# Patient Record
Sex: MALE | Race: ASIAN | NOT HISPANIC OR LATINO | URBAN - METROPOLITAN AREA
[De-identification: names, ages, dates, MRNs, and addresses within clinical notes are randomized per-mention and may not be internally consistent; named-entity substitution may affect disease eponyms.]

---

## 2019-03-26 ENCOUNTER — OFFICE VISIT - HEALTHEAST (OUTPATIENT)
Dept: FAMILY MEDICINE | Facility: CLINIC | Age: 64
End: 2019-03-26

## 2019-03-26 DIAGNOSIS — R73.09 ELEVATED HEMOGLOBIN A1C: ICD-10-CM

## 2019-03-26 DIAGNOSIS — Z00.00 ANNUAL PHYSICAL EXAM: ICD-10-CM

## 2019-03-26 DIAGNOSIS — Z12.11 SCREEN FOR COLON CANCER: ICD-10-CM

## 2019-03-26 DIAGNOSIS — E66.01 MORBID OBESITY (H): ICD-10-CM

## 2019-03-26 DIAGNOSIS — F41.9 ANXIETY: ICD-10-CM

## 2019-03-26 DIAGNOSIS — R03.0 ELEVATED BLOOD PRESSURE READING WITHOUT DIAGNOSIS OF HYPERTENSION: ICD-10-CM

## 2019-03-26 LAB
ALBUMIN SERPL-MCNC: 3.9 G/DL (ref 3.5–5)
ALP SERPL-CCNC: 87 U/L (ref 45–120)
ALT SERPL W P-5'-P-CCNC: 24 U/L (ref 0–45)
ANION GAP SERPL CALCULATED.3IONS-SCNC: 10 MMOL/L (ref 5–18)
AST SERPL W P-5'-P-CCNC: 17 U/L (ref 0–40)
BILIRUB SERPL-MCNC: 0.4 MG/DL (ref 0–1)
BUN SERPL-MCNC: 21 MG/DL (ref 8–22)
CALCIUM SERPL-MCNC: 9.6 MG/DL (ref 8.5–10.5)
CHLORIDE BLD-SCNC: 103 MMOL/L (ref 98–107)
CHOLEST SERPL-MCNC: 161 MG/DL
CO2 SERPL-SCNC: 29 MMOL/L (ref 22–31)
CREAT SERPL-MCNC: 0.88 MG/DL (ref 0.7–1.3)
FASTING STATUS PATIENT QL REPORTED: YES
GFR SERPL CREATININE-BSD FRML MDRD: >60 ML/MIN/1.73M2
GLUCOSE BLD-MCNC: 116 MG/DL (ref 70–125)
HBA1C MFR BLD: 6.6 % (ref 3.5–6)
HDLC SERPL-MCNC: 44 MG/DL
LDLC SERPL CALC-MCNC: 78 MG/DL
POTASSIUM BLD-SCNC: 4.5 MMOL/L (ref 3.5–5)
PROT SERPL-MCNC: 7.4 G/DL (ref 6–8)
SODIUM SERPL-SCNC: 142 MMOL/L (ref 136–145)
TRIGL SERPL-MCNC: 196 MG/DL

## 2019-03-26 ASSESSMENT — MIFFLIN-ST. JEOR: SCORE: 1812.2

## 2019-03-27 ENCOUNTER — COMMUNICATION - HEALTHEAST (OUTPATIENT)
Dept: FAMILY MEDICINE | Facility: CLINIC | Age: 64
End: 2019-03-27

## 2019-03-27 DIAGNOSIS — R73.09 ELEVATED HEMOGLOBIN A1C: ICD-10-CM

## 2019-03-29 RX ORDER — ALPRAZOLAM 0.5 MG
0.5 TABLET ORAL
Qty: 10 TABLET | Refills: 0 | Status: SHIPPED | OUTPATIENT
Start: 2019-03-29

## 2019-04-22 ENCOUNTER — RECORDS - HEALTHEAST (OUTPATIENT)
Dept: ADMINISTRATIVE | Facility: OTHER | Age: 64
End: 2019-04-22

## 2020-07-06 ENCOUNTER — OFFICE VISIT - HEALTHEAST (OUTPATIENT)
Dept: FAMILY MEDICINE | Facility: CLINIC | Age: 65
End: 2020-07-06

## 2020-07-06 ENCOUNTER — COMMUNICATION - HEALTHEAST (OUTPATIENT)
Dept: FAMILY MEDICINE | Facility: CLINIC | Age: 65
End: 2020-07-06

## 2020-07-06 DIAGNOSIS — E66.01 MORBID OBESITY (H): ICD-10-CM

## 2020-07-06 DIAGNOSIS — E78.2 MIXED HYPERLIPIDEMIA: ICD-10-CM

## 2020-07-06 DIAGNOSIS — Z00.00 ROUTINE GENERAL MEDICAL EXAMINATION AT A HEALTH CARE FACILITY: ICD-10-CM

## 2020-07-06 DIAGNOSIS — Z12.5 SCREENING FOR PROSTATE CANCER: ICD-10-CM

## 2020-07-06 DIAGNOSIS — I10 ESSENTIAL HYPERTENSION: ICD-10-CM

## 2020-07-06 DIAGNOSIS — R73.09 ELEVATED HEMOGLOBIN A1C: ICD-10-CM

## 2020-07-06 DIAGNOSIS — R10.31 RLQ ABDOMINAL PAIN: ICD-10-CM

## 2020-07-06 LAB
ALBUMIN SERPL-MCNC: 3.7 G/DL (ref 3.5–5)
ALBUMIN UR-MCNC: ABNORMAL MG/DL
ALP SERPL-CCNC: 91 U/L (ref 45–120)
ALT SERPL W P-5'-P-CCNC: 33 U/L (ref 0–45)
ANION GAP SERPL CALCULATED.3IONS-SCNC: 11 MMOL/L (ref 5–18)
APPEARANCE UR: CLEAR
AST SERPL W P-5'-P-CCNC: 20 U/L (ref 0–40)
ATRIAL RATE - MUSE: 68 BPM
BACTERIA #/AREA URNS HPF: ABNORMAL HPF
BASOPHILS # BLD AUTO: 0 THOU/UL (ref 0–0.2)
BASOPHILS NFR BLD AUTO: 0 % (ref 0–2)
BILIRUB SERPL-MCNC: 0.4 MG/DL (ref 0–1)
BILIRUB UR QL STRIP: NEGATIVE
BUN SERPL-MCNC: 19 MG/DL (ref 8–22)
CALCIUM SERPL-MCNC: 9.4 MG/DL (ref 8.5–10.5)
CHLORIDE BLD-SCNC: 103 MMOL/L (ref 98–107)
CHOLEST SERPL-MCNC: 228 MG/DL
CO2 SERPL-SCNC: 27 MMOL/L (ref 22–31)
COLOR UR AUTO: YELLOW
CREAT SERPL-MCNC: 0.93 MG/DL (ref 0.7–1.3)
DIASTOLIC BLOOD PRESSURE - MUSE: NORMAL
EOSINOPHIL # BLD AUTO: 0.3 THOU/UL (ref 0–0.4)
EOSINOPHIL NFR BLD AUTO: 5 % (ref 0–6)
ERYTHROCYTE [DISTWIDTH] IN BLOOD BY AUTOMATED COUNT: 12.6 % (ref 11–14.5)
FASTING STATUS PATIENT QL REPORTED: YES
GFR SERPL CREATININE-BSD FRML MDRD: >60 ML/MIN/1.73M2
GLUCOSE BLD-MCNC: 156 MG/DL (ref 70–125)
GLUCOSE UR STRIP-MCNC: NEGATIVE MG/DL
HBA1C MFR BLD: 7.5 %
HCT VFR BLD AUTO: 41.1 % (ref 40–54)
HDLC SERPL-MCNC: 41 MG/DL
HGB BLD-MCNC: 14.2 G/DL (ref 14–18)
HGB UR QL STRIP: ABNORMAL
HYALINE CASTS #/AREA URNS LPF: ABNORMAL LPF
INTERPRETATION ECG - MUSE: NORMAL
KETONES UR STRIP-MCNC: NEGATIVE MG/DL
LDLC SERPL CALC-MCNC: 143 MG/DL
LEUKOCYTE ESTERASE UR QL STRIP: NEGATIVE
LYMPHOCYTES # BLD AUTO: 1.6 THOU/UL (ref 0.8–4.4)
LYMPHOCYTES NFR BLD AUTO: 27 % (ref 20–40)
MCH RBC QN AUTO: 31.3 PG (ref 27–34)
MCHC RBC AUTO-ENTMCNC: 34.5 G/DL (ref 32–36)
MCV RBC AUTO: 91 FL (ref 80–100)
MONOCYTES # BLD AUTO: 0.4 THOU/UL (ref 0–0.9)
MONOCYTES NFR BLD AUTO: 6 % (ref 2–10)
MUCOUS THREADS #/AREA URNS LPF: ABNORMAL LPF
NEUTROPHILS # BLD AUTO: 3.8 THOU/UL (ref 2–7.7)
NEUTROPHILS NFR BLD AUTO: 62 % (ref 50–70)
NITRATE UR QL: NEGATIVE
P AXIS - MUSE: 16 DEGREES
PH UR STRIP: 5.5 [PH] (ref 5–8)
PLATELET # BLD AUTO: 203 THOU/UL (ref 140–440)
PMV BLD AUTO: 8.1 FL (ref 7–10)
POTASSIUM BLD-SCNC: 4.5 MMOL/L (ref 3.5–5)
PR INTERVAL - MUSE: 160 MS
PROT SERPL-MCNC: 7.3 G/DL (ref 6–8)
PSA SERPL-MCNC: <0.1 NG/ML (ref 0–4.5)
QRS DURATION - MUSE: 100 MS
QT - MUSE: 414 MS
QTC - MUSE: 440 MS
R AXIS - MUSE: 11 DEGREES
RBC # BLD AUTO: 4.53 MILL/UL (ref 4.4–6.2)
RBC #/AREA URNS AUTO: ABNORMAL HPF
SODIUM SERPL-SCNC: 141 MMOL/L (ref 136–145)
SP GR UR STRIP: 1.02 (ref 1–1.03)
SYSTOLIC BLOOD PRESSURE - MUSE: NORMAL
T AXIS - MUSE: 7 DEGREES
TRIGL SERPL-MCNC: 220 MG/DL
UROBILINOGEN UR STRIP-ACNC: ABNORMAL
VENTRICULAR RATE- MUSE: 68 BPM
WBC #/AREA URNS AUTO: ABNORMAL HPF
WBC: 6.1 THOU/UL (ref 4–11)

## 2020-07-06 RX ORDER — ATORVASTATIN CALCIUM 20 MG/1
20 TABLET, FILM COATED ORAL AT BEDTIME
Qty: 90 TABLET | Refills: 3 | Status: SHIPPED | OUTPATIENT
Start: 2020-07-06

## 2020-07-06 RX ORDER — LISINOPRIL/HYDROCHLOROTHIAZIDE 10-12.5 MG
1 TABLET ORAL DAILY
Qty: 90 TABLET | Refills: 3 | Status: SHIPPED | OUTPATIENT
Start: 2020-07-06

## 2020-07-06 ASSESSMENT — MIFFLIN-ST. JEOR: SCORE: 1684.4

## 2020-07-07 ENCOUNTER — COMMUNICATION - HEALTHEAST (OUTPATIENT)
Dept: FAMILY MEDICINE | Facility: CLINIC | Age: 65
End: 2020-07-07

## 2020-07-07 LAB — BACTERIA SPEC CULT: NO GROWTH

## 2020-07-13 ENCOUNTER — OFFICE VISIT - HEALTHEAST (OUTPATIENT)
Dept: FAMILY MEDICINE | Facility: CLINIC | Age: 65
End: 2020-07-13

## 2020-07-13 ENCOUNTER — COMMUNICATION - HEALTHEAST (OUTPATIENT)
Dept: FAMILY MEDICINE | Facility: CLINIC | Age: 65
End: 2020-07-13

## 2020-07-13 DIAGNOSIS — R31.29 MICROSCOPIC HEMATURIA: ICD-10-CM

## 2020-07-13 DIAGNOSIS — E11.65 TYPE 2 DIABETES MELLITUS WITH HYPERGLYCEMIA, WITHOUT LONG-TERM CURRENT USE OF INSULIN (H): ICD-10-CM

## 2020-07-13 DIAGNOSIS — E78.2 MIXED HYPERLIPIDEMIA: ICD-10-CM

## 2020-07-13 DIAGNOSIS — I10 ESSENTIAL HYPERTENSION: ICD-10-CM

## 2020-07-13 RX ORDER — METFORMIN HCL 500 MG
500 TABLET, EXTENDED RELEASE 24 HR ORAL
Qty: 90 TABLET | Refills: 3 | Status: SHIPPED | OUTPATIENT
Start: 2020-07-13

## 2020-07-20 ENCOUNTER — COMMUNICATION - HEALTHEAST (OUTPATIENT)
Dept: FAMILY MEDICINE | Facility: CLINIC | Age: 65
End: 2020-07-20

## 2020-07-20 ENCOUNTER — AMBULATORY - HEALTHEAST (OUTPATIENT)
Dept: NURSING | Facility: CLINIC | Age: 65
End: 2020-07-20

## 2020-07-20 DIAGNOSIS — I10 ESSENTIAL HYPERTENSION: ICD-10-CM

## 2021-05-26 VITALS — SYSTOLIC BLOOD PRESSURE: 146 MMHG | HEART RATE: 70 BPM | DIASTOLIC BLOOD PRESSURE: 93 MMHG

## 2021-05-27 NOTE — TELEPHONE ENCOUNTER
"Who is calling:  Patient  Reason for Call:  Patient states he was taking his wife Atorvastatin and stopped taking it 3/24/19. Patient aware he should only take medications that are prescribed from him. Patient states understanding and will not take his wife's Atorvastatin prescription. Per below it states \"Writer let patient know that provider will address this and prescribe his own medication.\" Patient would like script sent to [facility].  Date of last appointment with primary care: 3/26/2019  Okay to leave a detailed message: No      "

## 2021-05-27 NOTE — TELEPHONE ENCOUNTER
Patient called back to see if Dr Riggs has replied to the below message. During our conversation trying to find out what the drug name is that he has been taking, he mentioned something that sounded very close to metoprolol. He will check the bottle when he get home from work and then call back with the correct medication name. He was advised not to take any medication that is prescribed to someone else unless directed by his physician. Patient verbalized understanding.

## 2021-05-27 NOTE — TELEPHONE ENCOUNTER
I would like to know how much dose of atorvastatin he is taking.  Since then he has been diagnosed with diabetes mellitus.  The goal of LDL would be 70 mg.  If he is not initiated on any medication, then I would reintroduce atorvastatin.     Atorvastatin 20 mg daily is being sent to his pharmacy.

## 2021-05-27 NOTE — TELEPHONE ENCOUNTER
Patient received message and expressed a clear understanding.  He has been taking his wife's cholesterol medication for a couple days.  Writer let patient know that provider will address this and prescribe his own medication.

## 2021-05-27 NOTE — TELEPHONE ENCOUNTER
----- Message from Kevin Riggs MD sent at 3/27/2019  9:03 AM CDT -----  Please inform patient that his lab shows that he has diabetes.  It is still early.  I am going to create a referral for diabetes educator.  Dietary modification and weight loss can help control.  In addition, I do note that his cholesterol panel appears normal than before.  Though he informs me that he is not following with any doctor, is he on any lipid-lowering medications?  If he is taking cholesterol-lowering medication, I would advise that he continue as he is at high risk.  Otherwise, we will pursue this at future visit.  I recommend he follow-up as suggested in 1 month time    Kevin Riggs MD  3/27/2019

## 2021-05-27 NOTE — PROGRESS NOTES
Assessment:     1. Annual physical exam  Comprehensive Metabolic Panel    Lipid [place] FASTING   2. Morbid obesity (H)  Glycosylated Hemoglobin A1c   3. Screen for colon cancer  Cologuard   4. Elevated blood pressure reading without diagnosis of hypertension  Glycosylated Hemoglobin A1c   5. Anxiety  ALPRAZolam (XANAX) 0.5 MG tablet   6. Elevated hemoglobin A1c  Ambulatory referral to Diabetes Education Program (Existing Diagnosis)        Healthy male exam.    Patient with morbid obesity.  Counseling on diet and exercise done.     At the time of documentation patient is noted to have elevated hemoglobin A1c a diabetes education referral is done.  This is a new diagnosis and will continue to monitor since we have another reading.  Lipid panel was fairly normal.  I did review his epic chart and noted that in the past he has had elevated cholesterol.  A follow-up call was made to the patient who did say he had been sharing his wife's cholesterol medication.  Most likely with his diabetes, he is a candidate of statin therapy.  We will discussed this with him and his next visit as to what medication he is taking and will try to give similar as he has been tolerating it well.    For his anxiety, he would like a few pills of alprazolam which will be faxed to his pharmacy.  It was discussed with him that this will be a total of 10 in number and no refills would be given as it will be assumed that he is going to take for his flying and occasional anxiety when he is out of country.     Plan:       All questions answered.  Diagnosis explained in detail, including differential.  Discussed healthy lifestyle modifications.  Educational material distributed.     Subjective:      Herminio Varela is a 63 y.o. male who presents for an annual exam. The patient reports that there is not domestic violence in his life.   Has not seen a doctor for some time.    In the past has used Xanax for anxiety.  Usually when out of country or  traveling.  Does not endorse anxiety on a daily basis.    Healthy Habits:   Regular Exercise: No and just started at [facility]  Sunscreen Use: Yes and No  Healthy Diet: Yes and No  Dental Visits Regularly: Yes  Seat Belt: Yes  Sexually active: Yes  Monthly Self Testicular Exams:  Yes  Hemoccults: No  Flex Sig: No  Colonoscopy: Yes  Lipid Profile: No  Glucose Screen: No  Prevention of Osteoporosis: N/A  Last Dexa: N/A  Guns at Home:  No      Immunization History   Administered Date(s) Administered     IG-IM 06/01/1992     INFLUENZA,RECOMBINANT,INJ,PF QUADRIVALENT 18+YRS 03/26/2019     Tdap 03/26/2019     Immunization status: delayed.    No exam data present    Current Outpatient Medications   Medication Sig Dispense Refill     ALPRAZolam (XANAX) 0.5 MG tablet Take 1 tablet (0.5 mg total) by mouth at bedtime as needed for sleep or anxiety. 10 tablet 0     No current facility-administered medications for this visit.      Past Medical History:   Diagnosis Date     Peptic ulcer      History reviewed. No pertinent surgical history.  Aspirin  Family History   Problem Relation Age of Onset     Diabetes Mother         83 years old     Stroke Father      Alcohol abuse Father      No Medical Problems Sister      No Medical Problems Brother      Social History     Socioeconomic History     Marital status:      Spouse name: Not on file     Number of children: Not on file     Years of education: Not on file     Highest education level: Not on file   Occupational History     Not on file   Social Needs     Financial resource strain: Not on file     Food insecurity:     Worry: Not on file     Inability: Not on file     Transportation needs:     Medical: Not on file     Non-medical: Not on file   Tobacco Use     Smoking status: Not on file   Substance and Sexual Activity     Alcohol use: Not on file     Drug use: Not on file     Sexual activity: Not on file   Lifestyle     Physical activity:     Days per week: Not on file      "Minutes per session: Not on file     Stress: Not on file   Relationships     Social connections:     Talks on phone: Not on file     Gets together: Not on file     Attends Voodoo service: Not on file     Active member of club or organization: Not on file     Attends meetings of clubs or organizations: Not on file     Relationship status: Not on file     Intimate partner violence:     Fear of current or ex partner: Not on file     Emotionally abused: Not on file     Physically abused: Not on file     Forced sexual activity: Not on file   Other Topics Concern     Not on file   Social History Narrative     and 6 children ( 38- 27 years old children). Has grandchildren too,    Lives with mom and wife.        Owns a  and translation  Company.    Kevin Riggs MD  3/26/2019           Review of Systems  General:  Denies problem  Eyes: Denies problem  Ears/Nose/Throat: Denies problem  Cardiovascular: Denies problem  Respiratory:  Denies problem  Gastrointestinal:  Denies problem  Genitourinary: Denies problem  Musculoskeletal:  Denies problem  Skin: Denies problem  Neurologic: Denies problem  Psychiatric: Denies problem  Endocrine: Denies problem  Heme/Lymphatic: Denies problem   Allergic/Immunologic: Denies problem        Objective:     Vitals:    03/26/19 1102 03/26/19 1135   BP: (!) 164/91 155/82   Pulse: 71    Resp: 12    Temp: 97.4  F (36.3  C)    TempSrc: Oral    SpO2: 97%    Weight: (!) 247 lb 9.6 oz (112.3 kg)    Height: 5' 3.25\" (1.607 m)      Body mass index is 43.51 kg/m .    Physical  General Appearance: Alert, cooperative, no distress, appears stated age  Head: Normocephalic, without obvious abnormality, atraumatic  Eyes: PERRL, conjunctiva/corneas clear, EOM's intact  Ears: Normal TM's and external ear canals, both ears  Nose: Nares normal, septum midline,mucosa normal, no drainage  Throat: Lips, mucosa, and tongue normal; teeth and gums normal  Neck: Supple, symmetrical, trachea " midline, no adenopathy;  thyroid: not enlarged, symmetric, no tenderness/mass/nodules; no carotid bruit or JVD  Back: Symmetric, no curvature, ROM normal, no CVA tenderness  Lungs: Clear to auscultation bilaterally, respirations unlabored  Heart: Regular rate and rhythm, S1 and S2 normal, no murmur, rub, or gallop,  Abdomen: Soft, non-tender, bowel sounds active all four quadrants,  no masses, no organomegaly  Musculoskeletal: Normal range of motion. No joint swelling or deformity.   Extremities: Extremities normal, atraumatic, no cyanosis or edema  Skin: Skin color, texture, turgor normal, no rashes or lesions  Lymph nodes: Cervical, supraclavicular, and axillary nodes normal  Neurologic: He is alert. He has normal reflexes.   Psychiatric: He has a normal mood and affect.

## 2021-06-02 VITALS — WEIGHT: 247.6 LBS | BODY MASS INDEX: 43.87 KG/M2 | HEIGHT: 63 IN

## 2021-06-04 VITALS
HEART RATE: 74 BPM | DIASTOLIC BLOOD PRESSURE: 108 MMHG | HEIGHT: 62 IN | SYSTOLIC BLOOD PRESSURE: 190 MMHG | BODY MASS INDEX: 41.18 KG/M2 | WEIGHT: 223.8 LBS

## 2021-06-09 NOTE — TELEPHONE ENCOUNTER
----- Message from Francie Mcduffie MD sent at 7/6/2020 10:31 PM CDT -----  Please call patient:  Lang,  Your labs show that you have blood in the urine and diabetes.  We need to get together at another visit to talk about this and to start on a medicine for your diabetes.  The kidney function and liver function are normal.  The cholesterol levels are elevated, I recommend restarting the cholesterol medicine as we discussed.   Staff: please help patient schedule a follow-up visit to discuss new diabetes.  This can be virtual.

## 2021-06-09 NOTE — PROGRESS NOTES
Inform patient that her blood pressure is not an ideal control.  The goal blood pressure is less than 130/80 mm of Hg. he has recently followed with Dr. Mcduffie for new onset of diabetes.  I do suggest follow-up with Dr. Mcduffie or myself in about 2 month's time to review medications and recheck diabetes numbers    Kevin Riggs MD  7/20/2020

## 2021-06-09 NOTE — PROGRESS NOTES
Assessment/Plan:     1. Routine general medical examination at a health care facility  Routine history and physical, updated in EMR.  Labs updated as below.  Immunizations are up-to-date with the exception of shingles vaccine, patient to check with his insurance something about whether he wants this.  See below for problem specific plans.  Plan repeat physical in 1 year.  We discussed risks versus benefits of colon cancer screening and patient defers this for now.  We will discuss this again at a future visit.    2. RLQ abdominal pain  This is mild and apparently resolving.  Hemogram is normal without signs of infection or inflammation.  Patient can follow-up as needed if this persists or worsens.  - HM1 (CBC and Differential)  - HM1 (CBC with Diff)    3. Essential hypertension  This is a new diagnosis for the patient.  Work-up was undertaken as below and normal with the exception of microscopic hematuria.  EKG is normal with no previous for comparison.  Discussed risks versus benefits of treatment and patient wishes to proceed.  Will treat with lisinopril-hydrochlorothiazide.  Patient will return in 10 to 14 days for recheck of BMP on this medication.  - Comprehensive Metabolic Panel  - HM1 (CBC and Differential)  - Urinalysis-UC if Indicated  - Electrocardiogram Perform and Read  - Lipid [place] FASTING  - HM1 (CBC with Diff)  - lisinopriL-hydrochlorothiazide (ZESTORETIC) 10-12.5 mg per tablet; Take 1 tablet by mouth daily.  Dispense: 90 tablet; Refill: 3  - Culture, Urine    4. Morbid obesity (H)  Discussed the importance of a low-carb, low sugar diet and regular cardiovascular exercise.  Patient is contemplative about these changes.  - Lipid Cascade FASTING  - Glycosylated Hemoglobin A1c    5. Elevated hemoglobin A1c  This is a historical diagnosis for the patient.  A1c confirms type 2 diabetes mellitus.  Advised patient to schedule a follow-up visit to discuss treatment.  - Comprehensive Metabolic Panel  -  Glycosylated Hemoglobin A1c  - atorvastatin (LIPITOR) 20 MG tablet; Take 1 tablet (20 mg total) by mouth at bedtime.  Dispense: 90 tablet; Refill: 3    6. Screening for prostate cancer  Routine PSA drawn today and normal.  - PSA, Annual Screen (Prostatic-Specific Antigen)    7. Mixed hyperlipidemia  Discussed with patient restarting his Lipitor.  He still has this at home and promises to restart.      Subjective:      Herminio Varela is a 65 y.o. male who presents for an annual exam. The patient reports that there is not domestic violence in his life.     Patient states that during coronavirus, he has not been eating well or getting regular exercise.  He has been overeating and has gained some weight.  He has some occasional right lower quadrant discomfort, nothing severe.  He denies any fevers, denies any stool changes.  He does have a vague history of what sounds like rectal cancer.  He refuses further colonoscopy.  He states that he followed with some sort of Eastern medicine provider in [place] and was cured.  This will be an ongoing discussion.  He denies excessive thirst or frequent urination.  He has not been taking his cholesterol medicine since April for unclear reasons.  He mentions that in May of this year, he had an episode of urinary retention.  He was seen in the emergency department and a catheter was placed.  He had this in for 3 days, this was then removed by urology.  They noted that he likely had a urethral stricture.  He was given some options for treatment, but has not followed up since.  He has not had any further issues with urinary retention.  He denies chest pain or trouble breathing, denies lower extremity edema.    Healthy Habits:   Regular Exercise: No  Sunscreen Use: No  Healthy Diet: No  Dental Visits Regularly: No  Seat Belt: Yes  Sexually active: Yes  Monthly Self Testicular Exams:  Yes  Colonoscopy: Yes and 20 years ago?  Lipid Profile: Yes  Glucose Screen: Yes  Prevention of Osteoporosis:  No      Immunization History   Administered Date(s) Administered     IG-IM 06/01/1992     INFLUENZA,RECOMBINANT,INJ,PF QUADRIVALENT 18+YRS 03/26/2019     Tdap 03/26/2019     Immunization status: missing doses of Shingrix, patient to check with insurance.    No exam data present    Current Outpatient Medications   Medication Sig Dispense Refill     ALPRAZolam (XANAX) 0.5 MG tablet Take 1 tablet (0.5 mg total) by mouth at bedtime as needed for sleep or anxiety. 10 tablet 0     atorvastatin (LIPITOR) 20 MG tablet Take 1 tablet (20 mg total) by mouth daily. 30 tablet 11     No current facility-administered medications for this visit.      Past Medical History:   Diagnosis Date     Peptic ulcer      No past surgical history on file.  Aspirin  Family History   Problem Relation Age of Onset     Diabetes Mother         83 years old     Stroke Father      Alcohol abuse Father      No Medical Problems Sister      No Medical Problems Brother      Social History     Socioeconomic History     Marital status:      Spouse name: Not on file     Number of children: Not on file     Years of education: Not on file     Highest education level: Not on file   Occupational History     Not on file   Social Needs     Financial resource strain: Not on file     Food insecurity     Worry: Not on file     Inability: Not on file     Transportation needs     Medical: Not on file     Non-medical: Not on file   Tobacco Use     Smoking status: Never Smoker     Smokeless tobacco: Never Used   Substance and Sexual Activity     Alcohol use: Not on file     Drug use: Not on file     Sexual activity: Not on file   Lifestyle     Physical activity     Days per week: Not on file     Minutes per session: Not on file     Stress: Not on file   Relationships     Social connections     Talks on phone: Not on file     Gets together: Not on file     Attends Holiness service: Not on file     Active member of club or organization: Not on file     Attends  "meetings of clubs or organizations: Not on file     Relationship status: Not on file     Intimate partner violence     Fear of current or ex partner: Not on file     Emotionally abused: Not on file     Physically abused: Not on file     Forced sexual activity: Not on file   Other Topics Concern     Not on file   Social History Narrative     and 6 children ( 38- 27 years old children). Has grandchildren too,    Lives with mom and wife.        Owns a  and translation  Company.    Kevin Riggs MD  3/26/2019           Review of Systems  General:  Denies problem  Eyes: Denies problem  Ears/Nose/Throat: Denies problem  Cardiovascular: Denies problem  Respiratory:  Denies problem  Gastrointestinal:  Right lower quadrant discomfort  Genitourinary: Previous urinary retention, none lately  Musculoskeletal:  Denies problem  Skin: Denies problem  Neurologic: Denies problem  Psychiatric: Denies problem  Endocrine: Denies problem  Heme/Lymphatic: Denies problem   Allergic/Immunologic: Denies problem        Objective:     Vitals:    07/06/20 1022   BP: (!) 190/108   Pulse: 74   Weight: (!) 223 lb 12.8 oz (101.5 kg)   Height: 5' 2\" (1.575 m)     Body mass index is 40.93 kg/m .    Physical  General Appearance: Alert, cooperative, no distress, appears stated age  Head: Normocephalic, without obvious abnormality, atraumatic  Eyes: PERRL, conjunctiva/corneas clear, EOM's intact  Ears: Normal TM's and external ear canals, both ears  Nose: Nares normal, septum midline, mucosa normal, no drainage  Throat: Lips, mucosa, and tongue normal; teeth and gums normal  Neck: Supple, symmetrical, trachea midline, no adenopathy; thyroid: not enlarged, symmetric, no tenderness/mass/nodules; no carotid bruit or JVD  Back: Symmetric, no curvature, ROM normal, no CVA tenderness  Lungs: Clear to auscultation bilaterally, respirations unlabored  Heart: Regular rate and rhythm, S1 and S2 normal, no murmur, rub, or gallop  Abdomen: " Soft, non-tender, bowel sounds active all four quadrants, no masses, no organomegaly  Genitourinary: Refused  Musculoskeletal: Normal range of motion. No joint swelling or deformity.   Extremities: Extremities normal, atraumatic, no cyanosis or edema  Skin: Skin color, texture, turgor normal, no rashes or lesions  Lymph nodes: Cervical, supraclavicular, and axillary nodes normal  Neurologic: He is alert. He has normal reflexes.   Psychiatric: He has a normal mood and affect.         Results for orders placed or performed in visit on 07/06/20   Culture, Urine    Specimen: Urine   Result Value Ref Range    Culture No Growth    PSA, Annual Screen (Prostatic-Specific Antigen)   Result Value Ref Range    PSA <0.1 0.0 - 4.5 ng/mL   Comprehensive Metabolic Panel   Result Value Ref Range    Sodium 141 136 - 145 mmol/L    Potassium 4.5 3.5 - 5.0 mmol/L    Chloride 103 98 - 107 mmol/L    CO2 27 22 - 31 mmol/L    Anion Gap, Calculation 11 5 - 18 mmol/L    Glucose 156 (H) 70 - 125 mg/dL    BUN 19 8 - 22 mg/dL    Creatinine 0.93 0.70 - 1.30 mg/dL    GFR MDRD Af Amer >60 >60 mL/min/1.73m2    GFR MDRD Non Af Amer >60 >60 mL/min/1.73m2    Bilirubin, Total 0.4 0.0 - 1.0 mg/dL    Calcium 9.4 8.5 - 10.5 mg/dL    Protein, Total 7.3 6.0 - 8.0 g/dL    Albumin 3.7 3.5 - 5.0 g/dL    Alkaline Phosphatase 91 45 - 120 U/L    AST 20 0 - 40 U/L    ALT 33 0 - 45 U/L   Urinalysis-UC if Indicated   Result Value Ref Range    Color, UA Yellow Colorless, Yellow, Straw, Light Yellow    Clarity, UA Clear Clear    Glucose, UA Negative Negative    Bilirubin, UA Negative Negative    Ketones, UA Negative Negative    Specific Gravity, UA 1.025 1.005 - 1.030    Blood, UA Small (!) Negative    pH, UA 5.5 5.0 - 8.0    Protein, UA 30 mg/dL (!) Negative mg/dL    Urobilinogen, UA 0.2 E.U./dL 0.2 E.U./dL, 1.0 E.U./dL    Nitrite, UA Negative Negative    Leukocytes, UA Negative Negative    Bacteria, UA None Seen None Seen hpf    RBC, UA 5-10 (!) None Seen, 0-2 hpf     WBC, UA 10-25 (!) None Seen, 0-5 hpf    Mucus, UA Few (!) None Seen lpf    Hyaline Casts, UA 0-5 0-5, None Seen lpf   Lipid Cascade FASTING   Result Value Ref Range    Cholesterol 228 (H) <=199 mg/dL    Triglycerides 220 (H) <=149 mg/dL    HDL Cholesterol 41 >=40 mg/dL    LDL Calculated 143 (H) <=129 mg/dL    Patient Fasting > 8hrs? Yes    Glycosylated Hemoglobin A1c   Result Value Ref Range    Hemoglobin A1c 7.5 (H) <=5.6 %   HM1 (CBC with Diff)   Result Value Ref Range    WBC 6.1 4.0 - 11.0 thou/uL    RBC 4.53 4.40 - 6.20 mill/uL    Hemoglobin 14.2 14.0 - 18.0 g/dL    Hematocrit 41.1 40.0 - 54.0 %    MCV 91 80 - 100 fL    MCH 31.3 27.0 - 34.0 pg    MCHC 34.5 32.0 - 36.0 g/dL    RDW 12.6 11.0 - 14.5 %    Platelets 203 140 - 440 thou/uL    MPV 8.1 7.0 - 10.0 fL    Neutrophils % 62 50 - 70 %    Lymphocytes % 27 20 - 40 %    Monocytes % 6 2 - 10 %    Eosinophils % 5 0 - 6 %    Basophils % 0 0 - 2 %    Neutrophils Absolute 3.8 2.0 - 7.7 thou/uL    Lymphocytes Absolute 1.6 0.8 - 4.4 thou/uL    Monocytes Absolute 0.4 0.0 - 0.9 thou/uL    Eosinophils Absolute 0.3 0.0 - 0.4 thou/uL    Basophils Absolute 0.0 0.0 - 0.2 thou/uL   Electrocardiogram Perform and Read   Result Value Ref Range    SYSTOLIC BLOOD PRESSURE      DIASTOLIC BLOOD PRESSURE      VENTRICULAR RATE 68 BPM    ATRIAL RATE 68 BPM    P-R INTERVAL 160 ms    QRS DURATION 100 ms    Q-T INTERVAL 414 ms    QTC CALCULATION (BEZET) 440 ms    P Axis 16 degrees    R AXIS 11 degrees    T AXIS 7 degrees    MUSE DIAGNOSIS       Normal sinus rhythm  Normal ECG  No previous ECGs available  Confirmed by TSERING ARRIETA MD LOC:[facility] ([id number]) on 7/6/2020 1:20:59 PM

## 2021-06-09 NOTE — TELEPHONE ENCOUNTER
----- Message from Francie Mcduffie MD sent at 7/20/2020 11:05 AM CDT -----      ----- Message -----  From: Jessica Sesay CMA  Sent: 7/20/2020  10:16 AM CDT  To: Francie Mcduffie MD

## 2021-06-09 NOTE — PROGRESS NOTES
"Herminio Varela is a 65 y.o. male who is being evaluated via a billable video visit.      The patient has been notified of following:     \"This video visit will be conducted via a call between you and your physician/provider. We have found that certain health care needs can be provided without the need for an in-person physical exam.  This service lets us provide the care you need with a video conversation.  If a prescription is necessary we can send it directly to your pharmacy.  If lab work is needed we can place an order for that and you can then stop by our lab to have the test done at a later time.    Video visits are billed at different rates depending on your insurance coverage. Please reach out to your insurance provider with any questions.    If during the course of the call the physician/provider feels a video visit is not appropriate, you will not be charged for this service.\"    Patient has given verbal consent to a Video visit? Yes  How would you like to obtain your AVS? AVS Preference: MyChart.  Patient would like the video invitation sent by: Text to cell phone: [phone]  Will anyone else be joining your video visit? No        Video Start Time: 8:28 AM    Additional provider notes:     Assessment/Plan:       1. Type 2 diabetes mellitus with hyperglycemia, without long-term current use of insulin (H)  This is a new diagnosis for the patient.  Discussed starting metformin once daily along with low-carb, low sugar diet and regular cardiovascular exercise.  Patient is agreeable.  Plan recheck A1c at a visit in 3 months.  - metFORMIN (GLUCOPHAGE XR) 500 MG 24 hr tablet; Take 1 tablet (500 mg total) by mouth daily with breakfast.  Dispense: 90 tablet; Refill: 3    2. Microscopic hematuria  This is not visible to the patient.  He has had a urologic issue previously as well, some retention.  He has information for urology and states that he will schedule a follow-up visit on his own.    3. Mixed " hyperlipidemia  Patient has resumed taking his Lipitor and tolerates this well.    4. Essential hypertension  Patient's recheck blood pressure is within goal range.  He has a lab appointment next week for a BMP on lisinopril-hydrochlorothiazide.        Subjective:       Herminio Varela is a 65 y.o. male who presents for a virtual visit to discuss lab results from his last visit.  He had an A1c of 7.5%, which gives him a new diagnosis of type 2 diabetes mellitus.  He admits that during quarantine, he has not been eating very healthy.  He has been overeating and not watching his carbohydrate intake.  He has been checking his blood pressure periodically, and this has been a bit better.  Readings are noted as below.  He feels well on his current medications.  He has had peptic ulcers x2 in the past, and so aspirin is contraindicated for him.  Overall he feels well and has no particular questions or concerns today.  To review his history briefly, he has followed with urology in May of this year for some urinary retention.  He was catheterized for 3 days.  He was told to follow-up with urology if he had any further issues.  He reports occasional burning with urination, but no visible blood in the urine.  He denies excessive thirst or frequent urination.    Labs Reviewed:  Lab Results   Component Value Date    WBC 6.1 07/06/2020    HGB 14.2 07/06/2020    HCT 41.1 07/06/2020     07/06/2020    CHOL 228 (H) 07/06/2020    TRIG 220 (H) 07/06/2020    HDL 41 07/06/2020    ALT 33 07/06/2020    AST 20 07/06/2020     07/06/2020    K 4.5 07/06/2020     07/06/2020    CREATININE 0.93 07/06/2020    BUN 19 07/06/2020    CO2 27 07/06/2020    PSA <0.1 07/06/2020    HGBA1C 7.5 (H) 07/06/2020       The following portions of the patient's history were reviewed and updated as appropriate: allergies, current medications, past medical history, past social history, past surgical history and problem list.      Current Outpatient  Medications:      atorvastatin (LIPITOR) 20 MG tablet, Take 1 tablet (20 mg total) by mouth at bedtime., Disp: 90 tablet, Rfl: 3     lisinopriL-hydrochlorothiazide (ZESTORETIC) 10-12.5 mg per tablet, Take 1 tablet by mouth daily., Disp: 90 tablet, Rfl: 3     ALPRAZolam (XANAX) 0.5 MG tablet, Take 1 tablet (0.5 mg total) by mouth at bedtime as needed for sleep or anxiety., Disp: 10 tablet, Rfl: 0    Review of Systems   A 12 point comprehensive review of systems was negative except as noted.      Objective:      There were no vitals taken for this visit.    General appearance: alert, appears stated age and cooperative  Head: Normocephalic, without obvious abnormality, atraumatic  Eyes: Conjunctivae clear, sclerae anicteric  Lungs: Speaks in full sentences, no audible shortness of breath, no cough during the visit  Neurologic: Grossly normal, alert and oriented, good historian              Video-Visit Details    Type of service:  Video Visit    Video End Time (time video stopped): 8:38 AM  Originating Location (pt. Location): Home    Distant Location (provider location):  [facility]     Platform used for Video Visit: Gomez Mcduffie MD

## 2021-06-09 NOTE — TELEPHONE ENCOUNTER
Upcoming Appointment Question  When is the appointment: Today  What is your appointment for?: Eve  Who is your appointment scheduled with?: Dr. Steffi Mcduffie  What is your question/concern?: I do not have the link for the video appointment today at 8:30 Please call to discuss.  Okay to leave a detailed message?: Yes

## 2021-06-09 NOTE — PROGRESS NOTES
Follow Up Blood Pressure Check    Herminio Varela is a 65 y.o. male recommended to follow up for blood pressure check by Kevin Riggs MD. Anihypertensive medications and adherence were verified: Yes.     Reason for visit:  Elevated blood pressure     Medication change at last visit:  lisinopriL-hydrochlorothiazide (ZESTORETIC) 10-12.5 mg per tablet    Today's Vitals:   Vitals:    07/20/20 0901 07/20/20 0911   BP: (!) 147/96 (!) 146/93   Patient Site: Left Arm Left Arm   Patient Position: Sitting Sitting   Cuff Size: Adult Large Adult Large   Pulse: 72 70           Lowest blood pressure today is 146/93 and they deny signs or symptoms of new onset: severe headache, fatigue, confusion, vision changes, chest pain, pounding in the chest, neck, ears, irregular heartbeat, difficulty breathing and blood in the urine.  Please inform patient of his/her blood pressure today.  If they are asymptomatic, the patient is to continue current medications.  This message will be routed to their provider, and they will be notified if a change in medication is recommended.        Jessica Sesay    Current Outpatient Medications   Medication Sig Dispense Refill     ALPRAZolam (XANAX) 0.5 MG tablet Take 1 tablet (0.5 mg total) by mouth at bedtime as needed for sleep or anxiety. 10 tablet 0     atorvastatin (LIPITOR) 20 MG tablet Take 1 tablet (20 mg total) by mouth at bedtime. 90 tablet 3     lisinopriL-hydrochlorothiazide (ZESTORETIC) 10-12.5 mg per tablet Take 1 tablet by mouth daily. 90 tablet 3     metFORMIN (GLUCOPHAGE XR) 500 MG 24 hr tablet Take 1 tablet (500 mg total) by mouth daily with breakfast. 90 tablet 3     No current facility-administered medications for this visit.

## 2021-06-09 NOTE — TELEPHONE ENCOUNTER
----- Message from Kevin Riggs MD sent at 7/20/2020  9:55 AM CDT -----  Inform patient that her blood pressure is not an ideal control.  The goal blood pressure is less than 130/80 mm of Hg. he has recently followed with Dr. Mcduffie for new onset of diabetes.  I do suggest follow-up with Dr. Mcduffie or myself in about 2 month's time to review medications and recheck diabetes numbers     Kevin Riggs MD  7/20/2020    ----- Message -----  From: Jessica Sesay CMA  Sent: 7/20/2020   9:13 AM CDT  To: Kevin Riggs MD

## 2021-06-16 PROBLEM — F41.9 ANXIETY: Status: ACTIVE | Noted: 2019-03-29

## 2021-06-16 PROBLEM — E66.01 MORBID OBESITY (H): Status: ACTIVE | Noted: 2019-03-26

## 2021-06-16 PROBLEM — R31.29 MICROSCOPIC HEMATURIA: Status: ACTIVE | Noted: 2020-07-13

## 2021-06-16 PROBLEM — E78.2 MIXED HYPERLIPIDEMIA: Status: ACTIVE | Noted: 2020-07-13

## 2021-06-16 PROBLEM — E11.65 TYPE 2 DIABETES MELLITUS WITH HYPERGLYCEMIA, WITHOUT LONG-TERM CURRENT USE OF INSULIN (H): Status: ACTIVE | Noted: 2019-03-29

## 2021-06-16 PROBLEM — I10 ESSENTIAL HYPERTENSION: Status: ACTIVE | Noted: 2019-03-29

## 2021-06-17 NOTE — PATIENT INSTRUCTIONS - HE
"Patient Instructions by Kevin Riggs MD at 3/26/2019 10:30 AM     Author: Kevin Riggs MD Service: -- Author Type: Physician    Filed: 3/26/2019 11:29 AM Encounter Date: 3/26/2019 Status: Addendum    : Kevin Riggs MD (Physician)    Related Notes: Original Note by Kevin Riggs MD (Physician) filed at 3/26/2019 11:25 AM       Keep a check of blood pressure outside the clinic 1-2 times per week and return for clinic visit in1 months time.  Sooner if most blood pressure readings are greater than 160/ 90mmHg   Also seek help immediately if you have  any symptoms referable to  elevated blood pressure, specifically chest pain, palpitations, shortness of breath or swelling in the legs, change in vision or headache.    Recommend that you see an eye doctor.    We are doing blood work and I will let you know the results.      Understanding Body Mass Index (BMI)  Body mass index (BMI) is a method of screening for a weight category using the ratio of your height to your weight. The BMI is a measure of overweight that is corrected for height. Knowing your BMI is a way to tell if you are at a healthy weight. The higher your BMI, the greater your risk for weight-related health problems.  What BMI means    BMI below 18.5: Underweight    BMI 18.5 to 24.9: Healthy weight or \"ideal body weight\"     BMI 25 to 29.9: Overweight    BMI 30 and over: Obese    BMI 40 and over: Severe obesity   Online BMI Calculators  Find your BMI with an online BMI calculator tool, such as these from the CDC:    BMI calculator for adults    BMI calculator for children and teens   Using the BMI chart  To figure out your BMI, find your height and weight (or the numbers closest to them) on the table below. Follow each column of numbers to where your height and weight meet on the table. That is your BMI.    Date Last Reviewed: 7/1/2016 2000-2017 [employer]. [address] [place] [address]. All rights " reserved. This information is not intended as a substitute for professional medical care. Always follow your healthcare professional's instructions.

## 2021-06-20 NOTE — LETTER
Letter by Francie Mcduffie MD at      Author: Francie Mcduffie MD Service: -- Author Type: --    Filed:  Encounter Date: 7/6/2020 Status: (Other)         Herminio Varela  [address] [facility] [id number]             July 6, 2020         Dear Mr. Varela,    Below are the results from your recent visit:    Resulted Orders   Electrocardiogram Perform and Read   Result Value Ref Range    SYSTOLIC BLOOD PRESSURE      DIASTOLIC BLOOD PRESSURE      VENTRICULAR RATE 68 BPM    ATRIAL RATE 68 BPM    P-R INTERVAL 160 ms    QRS DURATION 100 ms    Q-T INTERVAL 414 ms    QTC CALCULATION (BEZET) 440 ms    P Axis 16 degrees    R AXIS 11 degrees    T AXIS 7 degrees    MUSE DIAGNOSIS       Normal sinus rhythm  Normal ECG  No previous ECGs available  Confirmed by TSERING ARRIETA MD LOC:[facility] ([id number]) on 7/6/2020 1:20:59 PM         Your EKG (heart tracing) looked good.    Please call with questions or contact us using [employer].    Sincerely,        Electronically signed by Francie Mcduffie MD

## 2021-06-27 ENCOUNTER — HEALTH MAINTENANCE LETTER (OUTPATIENT)
Age: 66
End: 2021-06-27

## 2021-07-03 NOTE — ADDENDUM NOTE
Addendum Note by Kacey Riggs MD at 4/5/2019  3:23 PM     Author: Kacey Riggs MD Service: -- Author Type: Physician    Filed: 4/5/2019  3:23 PM Encounter Date: 3/27/2019 Status: Signed    : Kacey Riggs MD (Physician)    Addended by: KACEY RIGGS on: 4/5/2019 03:23 PM        Modules accepted: Orders

## 2021-08-22 ENCOUNTER — HEALTH MAINTENANCE LETTER (OUTPATIENT)
Age: 66
End: 2021-08-22

## 2021-08-31 ENCOUNTER — LAB REQUISITION (OUTPATIENT)
Dept: LAB | Facility: CLINIC | Age: 66
End: 2021-08-31

## 2021-08-31 DIAGNOSIS — Z13.220 ENCOUNTER FOR SCREENING FOR LIPOID DISORDERS: ICD-10-CM

## 2021-08-31 DIAGNOSIS — Z13.1 ENCOUNTER FOR SCREENING FOR DIABETES MELLITUS: ICD-10-CM

## 2021-08-31 LAB
ANION GAP SERPL CALCULATED.3IONS-SCNC: 11 MMOL/L (ref 5–18)
BUN SERPL-MCNC: 24 MG/DL (ref 8–22)
CALCIUM SERPL-MCNC: 9.9 MG/DL (ref 8.5–10.5)
CHLORIDE BLD-SCNC: 101 MMOL/L (ref 98–107)
CHOLEST SERPL-MCNC: 148 MG/DL
CO2 SERPL-SCNC: 26 MMOL/L (ref 22–31)
CREAT SERPL-MCNC: 1.07 MG/DL (ref 0.7–1.3)
GFR SERPL CREATININE-BSD FRML MDRD: 73 ML/MIN/1.73M2
GLUCOSE BLD-MCNC: 153 MG/DL (ref 70–125)
HDLC SERPL-MCNC: 40 MG/DL
LDLC SERPL CALC-MCNC: 77 MG/DL
POTASSIUM BLD-SCNC: 4 MMOL/L (ref 3.5–5)
SODIUM SERPL-SCNC: 138 MMOL/L (ref 136–145)
TRIGL SERPL-MCNC: 155 MG/DL

## 2021-08-31 PROCEDURE — 80048 BASIC METABOLIC PNL TOTAL CA: CPT | Performed by: FAMILY MEDICINE

## 2021-08-31 PROCEDURE — 80061 LIPID PANEL: CPT | Performed by: FAMILY MEDICINE

## 2021-10-17 ENCOUNTER — HEALTH MAINTENANCE LETTER (OUTPATIENT)
Age: 66
End: 2021-10-17

## 2022-02-06 ENCOUNTER — HEALTH MAINTENANCE LETTER (OUTPATIENT)
Age: 67
End: 2022-02-06

## 2022-05-29 ENCOUNTER — HEALTH MAINTENANCE LETTER (OUTPATIENT)
Age: 67
End: 2022-05-29

## 2022-10-02 ENCOUNTER — HEALTH MAINTENANCE LETTER (OUTPATIENT)
Age: 67
End: 2022-10-02

## 2022-11-23 ENCOUNTER — LAB REQUISITION (OUTPATIENT)
Dept: LAB | Facility: CLINIC | Age: 67
End: 2022-11-23

## 2022-11-23 DIAGNOSIS — E78.2 MIXED HYPERLIPIDEMIA: ICD-10-CM

## 2022-11-23 DIAGNOSIS — I10 ESSENTIAL (PRIMARY) HYPERTENSION: ICD-10-CM

## 2022-11-23 LAB
ANION GAP SERPL CALCULATED.3IONS-SCNC: 11 MMOL/L (ref 7–15)
BUN SERPL-MCNC: 27.5 MG/DL (ref 8–23)
CALCIUM SERPL-MCNC: 9.4 MG/DL (ref 8.8–10.2)
CHLORIDE SERPL-SCNC: 102 MMOL/L (ref 98–107)
CHOLEST SERPL-MCNC: 187 MG/DL
CREAT SERPL-MCNC: 1.06 MG/DL (ref 0.67–1.17)
DEPRECATED HCO3 PLAS-SCNC: 26 MMOL/L (ref 22–29)
GFR SERPL CREATININE-BSD FRML MDRD: 78 ML/MIN/1.73M2
GLUCOSE SERPL-MCNC: 84 MG/DL (ref 70–99)
HDLC SERPL-MCNC: 53 MG/DL
LDLC SERPL CALC-MCNC: 105 MG/DL
NONHDLC SERPL-MCNC: 134 MG/DL
POTASSIUM SERPL-SCNC: 4.6 MMOL/L (ref 3.4–5.3)
SODIUM SERPL-SCNC: 139 MMOL/L (ref 136–145)
TRIGL SERPL-MCNC: 146 MG/DL

## 2022-11-23 PROCEDURE — 80061 LIPID PANEL: CPT | Performed by: FAMILY MEDICINE

## 2022-11-23 PROCEDURE — 82310 ASSAY OF CALCIUM: CPT | Performed by: FAMILY MEDICINE

## 2023-02-11 ENCOUNTER — HEALTH MAINTENANCE LETTER (OUTPATIENT)
Age: 68
End: 2023-02-11

## 2023-05-20 ENCOUNTER — HEALTH MAINTENANCE LETTER (OUTPATIENT)
Age: 68
End: 2023-05-20

## 2023-10-04 ENCOUNTER — LAB REQUISITION (OUTPATIENT)
Dept: LAB | Facility: CLINIC | Age: 68
End: 2023-10-04

## 2023-10-04 DIAGNOSIS — E11.9 TYPE 2 DIABETES MELLITUS WITHOUT COMPLICATIONS (H): ICD-10-CM

## 2023-10-04 PROCEDURE — 80048 BASIC METABOLIC PNL TOTAL CA: CPT | Performed by: FAMILY MEDICINE

## 2023-10-04 PROCEDURE — 80061 LIPID PANEL: CPT | Performed by: FAMILY MEDICINE

## 2023-10-05 LAB
ANION GAP SERPL CALCULATED.3IONS-SCNC: 12 MMOL/L (ref 7–15)
BUN SERPL-MCNC: 23.7 MG/DL (ref 8–23)
CALCIUM SERPL-MCNC: 9.5 MG/DL (ref 8.8–10.2)
CHLORIDE SERPL-SCNC: 102 MMOL/L (ref 98–107)
CHOLEST SERPL-MCNC: 161 MG/DL
CREAT SERPL-MCNC: 1.04 MG/DL (ref 0.67–1.17)
DEPRECATED HCO3 PLAS-SCNC: 25 MMOL/L (ref 22–29)
EGFRCR SERPLBLD CKD-EPI 2021: 79 ML/MIN/1.73M2
GLUCOSE SERPL-MCNC: 207 MG/DL (ref 70–99)
HDLC SERPL-MCNC: 44 MG/DL
LDLC SERPL CALC-MCNC: 82 MG/DL
NONHDLC SERPL-MCNC: 117 MG/DL
POTASSIUM SERPL-SCNC: 4.4 MMOL/L (ref 3.4–5.3)
SODIUM SERPL-SCNC: 139 MMOL/L (ref 135–145)
TRIGL SERPL-MCNC: 175 MG/DL

## 2023-10-21 ENCOUNTER — HEALTH MAINTENANCE LETTER (OUTPATIENT)
Age: 68
End: 2023-10-21

## 2024-02-27 ENCOUNTER — LAB REQUISITION (OUTPATIENT)
Dept: LAB | Facility: CLINIC | Age: 69
End: 2024-02-27

## 2024-02-27 DIAGNOSIS — Z12.5 ENCOUNTER FOR SCREENING FOR MALIGNANT NEOPLASM OF PROSTATE: ICD-10-CM

## 2024-02-27 PROCEDURE — G0103 PSA SCREENING: HCPCS | Performed by: PHYSICIAN ASSISTANT

## 2024-02-28 LAB — PSA SERPL DL<=0.01 NG/ML-MCNC: 0.1 NG/ML (ref 0–4.5)

## 2024-03-09 ENCOUNTER — HEALTH MAINTENANCE LETTER (OUTPATIENT)
Age: 69
End: 2024-03-09